# Patient Record
Sex: MALE | Race: WHITE | NOT HISPANIC OR LATINO | Employment: OTHER | URBAN - METROPOLITAN AREA
[De-identification: names, ages, dates, MRNs, and addresses within clinical notes are randomized per-mention and may not be internally consistent; named-entity substitution may affect disease eponyms.]

---

## 2018-11-20 ENCOUNTER — APPOINTMENT (OUTPATIENT)
Dept: RADIOLOGY | Facility: MEDICAL CENTER | Age: 73
DRG: 446 | End: 2018-11-20
Attending: STUDENT IN AN ORGANIZED HEALTH CARE EDUCATION/TRAINING PROGRAM
Payer: COMMERCIAL

## 2018-11-20 ENCOUNTER — HOSPITAL ENCOUNTER (INPATIENT)
Facility: MEDICAL CENTER | Age: 73
LOS: 2 days | DRG: 446 | End: 2018-11-22
Attending: EMERGENCY MEDICINE | Admitting: INTERNAL MEDICINE
Payer: COMMERCIAL

## 2018-11-20 DIAGNOSIS — E80.6 HYPERBILIRUBINEMIA: ICD-10-CM

## 2018-11-20 DIAGNOSIS — R74.01 TRANSAMINITIS: ICD-10-CM

## 2018-11-20 LAB
ALBUMIN SERPL BCP-MCNC: 4.7 G/DL (ref 3.2–4.9)
ALBUMIN/GLOB SERPL: 1.7 G/DL
ALP SERPL-CCNC: 176 U/L (ref 30–99)
ALT SERPL-CCNC: 787 U/L (ref 2–50)
ANION GAP SERPL CALC-SCNC: 10 MMOL/L (ref 0–11.9)
AST SERPL-CCNC: 348 U/L (ref 12–45)
BASOPHILS # BLD AUTO: 0.2 % (ref 0–1.8)
BASOPHILS # BLD: 0.01 K/UL (ref 0–0.12)
BILIRUB SERPL-MCNC: 2.8 MG/DL (ref 0.1–1.5)
BUN SERPL-MCNC: 11 MG/DL (ref 8–22)
CALCIUM SERPL-MCNC: 9.4 MG/DL (ref 8.5–10.5)
CHLORIDE SERPL-SCNC: 104 MMOL/L (ref 96–112)
CO2 SERPL-SCNC: 25 MMOL/L (ref 20–33)
CREAT SERPL-MCNC: 0.93 MG/DL (ref 0.5–1.4)
EOSINOPHIL # BLD AUTO: 0.24 K/UL (ref 0–0.51)
EOSINOPHIL NFR BLD: 4.8 % (ref 0–6.9)
ERYTHROCYTE [DISTWIDTH] IN BLOOD BY AUTOMATED COUNT: 44.1 FL (ref 35.9–50)
EST. AVERAGE GLUCOSE BLD GHB EST-MCNC: 146 MG/DL
FERRITIN SERPL-MCNC: 634.9 NG/ML (ref 22–322)
GLOBULIN SER CALC-MCNC: 2.8 G/DL (ref 1.9–3.5)
GLUCOSE SERPL-MCNC: 92 MG/DL (ref 65–99)
HBA1C MFR BLD: 6.7 % (ref 0–5.6)
HCT VFR BLD AUTO: 47.3 % (ref 42–52)
HGB BLD-MCNC: 16 G/DL (ref 14–18)
IMM GRANULOCYTES # BLD AUTO: 0.02 K/UL (ref 0–0.11)
IMM GRANULOCYTES NFR BLD AUTO: 0.4 % (ref 0–0.9)
IRON SATN MFR SERPL: 26 % (ref 15–55)
IRON SERPL-MCNC: 114 UG/DL (ref 50–180)
LIPASE SERPL-CCNC: 71 U/L (ref 11–82)
LYMPHOCYTES # BLD AUTO: 1.35 K/UL (ref 1–4.8)
LYMPHOCYTES NFR BLD: 26.9 % (ref 22–41)
MAGNESIUM SERPL-MCNC: 2 MG/DL (ref 1.5–2.5)
MCH RBC QN AUTO: 30.8 PG (ref 27–33)
MCHC RBC AUTO-ENTMCNC: 33.8 G/DL (ref 33.7–35.3)
MCV RBC AUTO: 91 FL (ref 81.4–97.8)
MONOCYTES # BLD AUTO: 0.45 K/UL (ref 0–0.85)
MONOCYTES NFR BLD AUTO: 9 % (ref 0–13.4)
NEUTROPHILS # BLD AUTO: 2.94 K/UL (ref 1.82–7.42)
NEUTROPHILS NFR BLD: 58.7 % (ref 44–72)
NRBC # BLD AUTO: 0 K/UL
NRBC BLD-RTO: 0 /100 WBC
PLATELET # BLD AUTO: 196 K/UL (ref 164–446)
PMV BLD AUTO: 10.2 FL (ref 9–12.9)
POTASSIUM SERPL-SCNC: 3.5 MMOL/L (ref 3.6–5.5)
PROT SERPL-MCNC: 7.5 G/DL (ref 6–8.2)
RBC # BLD AUTO: 5.2 M/UL (ref 4.7–6.1)
SODIUM SERPL-SCNC: 139 MMOL/L (ref 135–145)
TIBC SERPL-MCNC: 447 UG/DL (ref 250–450)
WBC # BLD AUTO: 5 K/UL (ref 4.8–10.8)

## 2018-11-20 PROCEDURE — 83550 IRON BINDING TEST: CPT

## 2018-11-20 PROCEDURE — 83690 ASSAY OF LIPASE: CPT

## 2018-11-20 PROCEDURE — 82728 ASSAY OF FERRITIN: CPT

## 2018-11-20 PROCEDURE — 80053 COMPREHEN METABOLIC PANEL: CPT

## 2018-11-20 PROCEDURE — 99285 EMERGENCY DEPT VISIT HI MDM: CPT

## 2018-11-20 PROCEDURE — 85025 COMPLETE CBC W/AUTO DIFF WBC: CPT

## 2018-11-20 PROCEDURE — 83036 HEMOGLOBIN GLYCOSYLATED A1C: CPT

## 2018-11-20 PROCEDURE — 80074 ACUTE HEPATITIS PANEL: CPT

## 2018-11-20 PROCEDURE — 76705 ECHO EXAM OF ABDOMEN: CPT

## 2018-11-20 PROCEDURE — 770006 HCHG ROOM/CARE - MED/SURG/GYN SEMI*

## 2018-11-20 PROCEDURE — 83735 ASSAY OF MAGNESIUM: CPT

## 2018-11-20 PROCEDURE — 83540 ASSAY OF IRON: CPT

## 2018-11-20 RX ORDER — LANOLIN ALCOHOL/MO/W.PET/CERES
1000 CREAM (GRAM) TOPICAL DAILY
COMMUNITY

## 2018-11-20 RX ORDER — MORPHINE SULFATE 10 MG/ML
1-3 INJECTION, SOLUTION INTRAMUSCULAR; INTRAVENOUS
Status: DISCONTINUED | OUTPATIENT
Start: 2018-11-20 | End: 2018-11-22 | Stop reason: HOSPADM

## 2018-11-20 RX ORDER — POLYETHYLENE GLYCOL 3350 17 G/17G
1 POWDER, FOR SOLUTION ORAL
Status: DISCONTINUED | OUTPATIENT
Start: 2018-11-20 | End: 2018-11-22 | Stop reason: HOSPADM

## 2018-11-20 RX ORDER — LABETALOL HYDROCHLORIDE 5 MG/ML
10 INJECTION, SOLUTION INTRAVENOUS EVERY 4 HOURS PRN
Status: DISCONTINUED | OUTPATIENT
Start: 2018-11-20 | End: 2018-11-21

## 2018-11-20 RX ORDER — SIMVASTATIN 40 MG
40 TABLET ORAL NIGHTLY
Status: ON HOLD | COMMUNITY
End: 2018-11-22

## 2018-11-20 RX ORDER — AMOXICILLIN 250 MG
2 CAPSULE ORAL 2 TIMES DAILY
Status: DISCONTINUED | OUTPATIENT
Start: 2018-11-20 | End: 2018-11-22 | Stop reason: HOSPADM

## 2018-11-20 RX ORDER — FOLIC ACID 1 MG/1
1 TABLET ORAL DAILY
Status: DISCONTINUED | OUTPATIENT
Start: 2018-11-21 | End: 2018-11-22 | Stop reason: HOSPADM

## 2018-11-20 RX ORDER — LISINOPRIL 10 MG/1
10 TABLET ORAL DAILY
Status: ON HOLD | COMMUNITY
End: 2018-11-22

## 2018-11-20 RX ORDER — CHOLECALCIFEROL (VITAMIN D3) 125 MCG
1000 CAPSULE ORAL DAILY
Status: DISCONTINUED | OUTPATIENT
Start: 2018-11-21 | End: 2018-11-22 | Stop reason: HOSPADM

## 2018-11-20 RX ORDER — LISINOPRIL 10 MG/1
10 TABLET ORAL DAILY
Status: DISCONTINUED | OUTPATIENT
Start: 2018-11-21 | End: 2018-11-21

## 2018-11-20 RX ORDER — BISACODYL 10 MG
10 SUPPOSITORY, RECTAL RECTAL
Status: DISCONTINUED | OUTPATIENT
Start: 2018-11-20 | End: 2018-11-22 | Stop reason: HOSPADM

## 2018-11-20 ASSESSMENT — LIFESTYLE VARIABLES
ON A TYPICAL DAY WHEN YOU DRINK ALCOHOL HOW MANY DRINKS DO YOU HAVE: 3
EVER FELT BAD OR GUILTY ABOUT YOUR DRINKING: YES
HAVE YOU EVER FELT YOU SHOULD CUT DOWN ON YOUR DRINKING: YES
TOTAL SCORE: 2
EVER_SMOKED: NEVER
DOES PATIENT WANT TO STOP DRINKING: NO
DO YOU DRINK ALCOHOL: YES
TOTAL SCORE: 2
HOW MANY TIMES IN THE PAST YEAR HAVE YOU HAD 5 OR MORE DRINKS IN A DAY: 0
TOTAL SCORE: 2
CONSUMPTION TOTAL: POSITIVE
ALCOHOL_USE: YES
AVERAGE NUMBER OF DAYS PER WEEK YOU HAVE A DRINK CONTAINING ALCOHOL: 7
EVER HAD A DRINK FIRST THING IN THE MORNING TO STEADY YOUR NERVES TO GET RID OF A HANGOVER: NO
HAVE PEOPLE ANNOYED YOU BY CRITICIZING YOUR DRINKING: NO

## 2018-11-20 ASSESSMENT — PAIN SCALES - GENERAL
PAINLEVEL_OUTOF10: 0
PAINLEVEL_OUTOF10: 2
PAINLEVEL_OUTOF10: 0

## 2018-11-20 ASSESSMENT — PATIENT HEALTH QUESTIONNAIRE - PHQ9
1. LITTLE INTEREST OR PLEASURE IN DOING THINGS: NOT AT ALL
SUM OF ALL RESPONSES TO PHQ9 QUESTIONS 1 AND 2: 0
2. FEELING DOWN, DEPRESSED, IRRITABLE, OR HOPELESS: NOT AT ALL

## 2018-11-21 ENCOUNTER — PATIENT OUTREACH (OUTPATIENT)
Dept: HEALTH INFORMATION MANAGEMENT | Facility: OTHER | Age: 73
End: 2018-11-21

## 2018-11-21 PROBLEM — E78.5 DYSLIPIDEMIA: Status: ACTIVE | Noted: 2018-11-21

## 2018-11-21 PROBLEM — I95.9 HYPOTENSION: Status: ACTIVE | Noted: 2018-11-21

## 2018-11-21 PROBLEM — R74.01 TRANSAMINITIS: Status: ACTIVE | Noted: 2018-11-21

## 2018-11-21 PROBLEM — R10.9 ABDOMINAL PAIN: Status: ACTIVE | Noted: 2018-11-21

## 2018-11-21 PROBLEM — E11.9 TYPE 2 DIABETES MELLITUS (HCC): Status: ACTIVE | Noted: 2018-11-21

## 2018-11-21 PROBLEM — E87.6 HYPOKALEMIA: Status: ACTIVE | Noted: 2018-11-21

## 2018-11-21 PROBLEM — I10 HTN (HYPERTENSION): Status: ACTIVE | Noted: 2018-11-21

## 2018-11-21 LAB
ALBUMIN SERPL BCP-MCNC: 4.2 G/DL (ref 3.2–4.9)
ALBUMIN/GLOB SERPL: 1.4 G/DL
ALP SERPL-CCNC: 153 U/L (ref 30–99)
ALT SERPL-CCNC: 537 U/L (ref 2–50)
AMPHET UR QL SCN: NEGATIVE
ANION GAP SERPL CALC-SCNC: 7 MMOL/L (ref 0–11.9)
APAP SERPL-MCNC: <10 UG/ML (ref 10–30)
APTT PPP: 29.4 SEC (ref 24.7–36)
AST SERPL-CCNC: 212 U/L (ref 12–45)
BARBITURATES UR QL SCN: NEGATIVE
BENZODIAZ UR QL SCN: NEGATIVE
BILIRUB CONJ SERPL-MCNC: 0.7 MG/DL (ref 0.1–0.5)
BILIRUB SERPL-MCNC: 1.6 MG/DL (ref 0.1–1.5)
BUN SERPL-MCNC: 14 MG/DL (ref 8–22)
BZE UR QL SCN: NEGATIVE
CALCIUM SERPL-MCNC: 9.3 MG/DL (ref 8.5–10.5)
CANNABINOIDS UR QL SCN: NEGATIVE
CHLORIDE SERPL-SCNC: 106 MMOL/L (ref 96–112)
CHOLEST SERPL-MCNC: 125 MG/DL (ref 100–199)
CO2 SERPL-SCNC: 27 MMOL/L (ref 20–33)
CREAT SERPL-MCNC: 1.15 MG/DL (ref 0.5–1.4)
GLOBULIN SER CALC-MCNC: 2.9 G/DL (ref 1.9–3.5)
GLUCOSE SERPL-MCNC: 146 MG/DL (ref 65–99)
H PYLORI AG STL QL IA: NOT DETECTED
HAV IGM SERPL QL IA: NEGATIVE
HBV CORE IGM SER QL: NEGATIVE
HBV SURFACE AG SER QL: NEGATIVE
HCV AB SER QL: NEGATIVE
HDLC SERPL-MCNC: 40 MG/DL
INR PPP: 0.96 (ref 0.87–1.13)
LDLC SERPL CALC-MCNC: 66 MG/DL
METHADONE UR QL SCN: NEGATIVE
OPIATES UR QL SCN: NEGATIVE
OXYCODONE UR QL SCN: NEGATIVE
PCP UR QL SCN: NEGATIVE
POTASSIUM SERPL-SCNC: 3.7 MMOL/L (ref 3.6–5.5)
PROPOXYPH UR QL SCN: NEGATIVE
PROT SERPL-MCNC: 7.1 G/DL (ref 6–8.2)
PROTHROMBIN TIME: 12.9 SEC (ref 12–14.6)
SALICYLATES SERPL-MCNC: 0 MG/DL (ref 15–25)
SODIUM SERPL-SCNC: 140 MMOL/L (ref 135–145)
TRIGL SERPL-MCNC: 93 MG/DL (ref 0–149)

## 2018-11-21 PROCEDURE — 700105 HCHG RX REV CODE 258: Performed by: INTERNAL MEDICINE

## 2018-11-21 PROCEDURE — 36415 COLL VENOUS BLD VENIPUNCTURE: CPT

## 2018-11-21 PROCEDURE — 80307 DRUG TEST PRSMV CHEM ANLYZR: CPT

## 2018-11-21 PROCEDURE — 85610 PROTHROMBIN TIME: CPT

## 2018-11-21 PROCEDURE — A9270 NON-COVERED ITEM OR SERVICE: HCPCS | Performed by: STUDENT IN AN ORGANIZED HEALTH CARE EDUCATION/TRAINING PROGRAM

## 2018-11-21 PROCEDURE — A9270 NON-COVERED ITEM OR SERVICE: HCPCS | Performed by: INTERNAL MEDICINE

## 2018-11-21 PROCEDURE — 87338 HPYLORI STOOL AG IA: CPT

## 2018-11-21 PROCEDURE — 770006 HCHG ROOM/CARE - MED/SURG/GYN SEMI*

## 2018-11-21 PROCEDURE — 700102 HCHG RX REV CODE 250 W/ 637 OVERRIDE(OP): Performed by: STUDENT IN AN ORGANIZED HEALTH CARE EDUCATION/TRAINING PROGRAM

## 2018-11-21 PROCEDURE — 700102 HCHG RX REV CODE 250 W/ 637 OVERRIDE(OP): Performed by: INTERNAL MEDICINE

## 2018-11-21 PROCEDURE — 99223 1ST HOSP IP/OBS HIGH 75: CPT | Mod: GC | Performed by: INTERNAL MEDICINE

## 2018-11-21 PROCEDURE — 80061 LIPID PANEL: CPT

## 2018-11-21 PROCEDURE — 85730 THROMBOPLASTIN TIME PARTIAL: CPT

## 2018-11-21 PROCEDURE — 80053 COMPREHEN METABOLIC PANEL: CPT

## 2018-11-21 PROCEDURE — 82248 BILIRUBIN DIRECT: CPT

## 2018-11-21 RX ORDER — POTASSIUM CHLORIDE 20 MEQ/1
40 TABLET, EXTENDED RELEASE ORAL ONCE
Status: COMPLETED | OUTPATIENT
Start: 2018-11-21 | End: 2018-11-21

## 2018-11-21 RX ORDER — POTASSIUM CHLORIDE 7.45 MG/ML
10 INJECTION INTRAVENOUS
Status: DISCONTINUED | OUTPATIENT
Start: 2018-11-21 | End: 2018-11-21

## 2018-11-21 RX ORDER — SODIUM CHLORIDE 9 MG/ML
2000 INJECTION, SOLUTION INTRAVENOUS ONCE
Status: COMPLETED | OUTPATIENT
Start: 2018-11-21 | End: 2018-11-21

## 2018-11-21 RX ORDER — THIAMINE MONONITRATE (VIT B1) 100 MG
100 TABLET ORAL DAILY
Status: DISCONTINUED | OUTPATIENT
Start: 2018-11-21 | End: 2018-11-22 | Stop reason: HOSPADM

## 2018-11-21 RX ORDER — HYDRALAZINE HYDROCHLORIDE 20 MG/ML
20 INJECTION INTRAMUSCULAR; INTRAVENOUS EVERY 6 HOURS PRN
Status: DISCONTINUED | OUTPATIENT
Start: 2018-11-21 | End: 2018-11-22 | Stop reason: HOSPADM

## 2018-11-21 RX ORDER — SODIUM CHLORIDE 9 MG/ML
INJECTION, SOLUTION INTRAVENOUS CONTINUOUS
Status: DISCONTINUED | OUTPATIENT
Start: 2018-11-21 | End: 2018-11-22 | Stop reason: HOSPADM

## 2018-11-21 RX ADMIN — SODIUM CHLORIDE: 9 INJECTION, SOLUTION INTRAVENOUS at 13:19

## 2018-11-21 RX ADMIN — SODIUM CHLORIDE 2000 ML: 9 INJECTION, SOLUTION INTRAVENOUS at 08:56

## 2018-11-21 RX ADMIN — POTASSIUM CHLORIDE 40 MEQ: 1500 TABLET, EXTENDED RELEASE ORAL at 10:27

## 2018-11-21 RX ADMIN — THIAMINE HCL TAB 100 MG 100 MG: 100 TAB at 10:26

## 2018-11-21 RX ADMIN — CYANOCOBALAMIN TAB 500 MCG 1000 MCG: 500 TAB at 10:28

## 2018-11-21 RX ADMIN — FOLIC ACID 1 MG: 1 TABLET ORAL at 10:27

## 2018-11-21 ASSESSMENT — PAIN SCALES - GENERAL
PAINLEVEL_OUTOF10: 0

## 2018-11-21 ASSESSMENT — COGNITIVE AND FUNCTIONAL STATUS - GENERAL
SUGGESTED CMS G CODE MODIFIER DAILY ACTIVITY: CH
DAILY ACTIVITIY SCORE: 24
MOBILITY SCORE: 24
SUGGESTED CMS G CODE MODIFIER MOBILITY: CH

## 2018-11-21 ASSESSMENT — ENCOUNTER SYMPTOMS
HEADACHES: 0
ORTHOPNEA: 0
NAUSEA: 1
ABDOMINAL PAIN: 0
VOMITING: 0
PALPITATIONS: 0
DIARRHEA: 0
FEVER: 0
CHILLS: 0
WEAKNESS: 0
BLURRED VISION: 0
HEARTBURN: 0
NERVOUS/ANXIOUS: 0
DIZZINESS: 0
BLOOD IN STOOL: 0
SHORTNESS OF BREATH: 0
SINUS PAIN: 0
DIAPHORESIS: 0
COUGH: 0
ABDOMINAL PAIN: 1
DEPRESSION: 0
BACK PAIN: 0
DOUBLE VISION: 0
NAUSEA: 0
CONSTIPATION: 0
MYALGIAS: 0
SORE THROAT: 0
CHILLS: 1

## 2018-11-21 NOTE — ASSESSMENT & PLAN NOTE
-Alcohol and tylenol use vs autoimmune hepatitis vs gallstones   -ALT/AST repeated on admit  787/348 Alk phos 176  -[facility]  [facility] 2.8 from 5.6  -US liver did not show any evidence of stones, mildly dilated CBD  -[facility]ol [facility] < 10 [facility], lipase wnl 77  -Hepatitis negative. UDS negative  -Follow up with PCP for further trending of LFT's.  Restart statin when enzymes normalized

## 2018-11-21 NOTE — ED NOTES
Hourly rounding performed. Assessed patient complaints and Bathroom/comfort needs.    Meal given

## 2018-11-21 NOTE — ED NOTES
Hourly rounding performed. Assessed patient complaints and Bathroom/comfort needs.    Pt ambulated to the bathroom. No Noted difficulties with ambulation.

## 2018-11-21 NOTE — NON-PROVIDER
Internal Medicine Medical Student Note  Note Author: Julia Abdullahi, Student    Name Erick Solitario       1945   Age/Sex 73 y.o. male   MRN [id number]   Code Status FULL              Reason for interval visit  (Principal Problem)   Abdominal pain    Interval Problem Daily Status Update  (problem status, last 24 hours, new history, new data )   MR. Solitario is a 74 y/o who was admitted for ongoing abdominal pain for the past 4 days. The pain is in the epigastric region and radiates to the back. He went to the ER in [facility] for the pain whereby CT and EKG were performed and unremarkable. There was H. Pylori antigen present for which he was prescribed treatment. He only took dicyclomine and not the other medications due to cost. The pain had waxed and waned which caused him to come to the [facility] ER and thereby receive admission. Ultrasound was performed that revealed mild extrahepatic dilation and mildly enlarged portal vein with hepatopedal flow.  He has had a past history of gallstones that warranted cholecystectomy over 30 years ago and describes this pain as similar. Upon admission, his AST and ALT were significantly elevated as well as total bilirubin/direct bilirubin and alkaline phosphatase.     Since admission, these levels have continued to decrease. His INR was within normal limits, so GI consult will not be needed. The patient's scleral icterus that was noted on admission is lessening and he reports no pain. The patient was also hypotensive, so fluids were provided. If his liver functioning levels continue to normalize, will consider discharging the patient tomorrow.         Physical Exam       Vitals:    18 2020 18 2244 18 0524 18 0805   BP:  148/85 (!) 96/56 122/74   Pulse:  85 70 68   Resp: (!)    Temp:  36.5 °C (97.7 °F) 36.9 °C (98.4 °F) 36.1 °C (97 °F)   TempSrc:  Temporal Temporal Temporal   SpO2: 96% 95% 96% 96%   Weight:       Height:         Body mass  index is 25.84 kg/m². Weight: 79.4 kg (175 lb)  Oxygen Therapy:  Pulse Oximetry: 96 %, O2 (LPM): 0, O2 Delivery: None (Room Air)    Physical Exam  General: appropriate, not in acute distress  HEENT: mild scleral icterus, normocephalic, atraumatic  Cardio: normal rate and rhythm  Pulm: clear to auscultation bilaterally  GI: no tenderness to palpation, no distension, no guarding  : no dysuria, no hematuria  Extremities: well-perfused, no edema          Assessment/Plan    Mr. Solitario is a 74 y/o man who was admitted last night for acute epigastric pain radiating to the back and elevated liver function enzymes. His plan of care will be as followed:     # elevated liver enzymes  # RUQ pain  -patient presented with significantly elevated liver enzymes  -u/s revealed mild extrahepatic dilation and mildly enlarged portal vein with hepatorenal flow; presentation is suggestive of gallstone possibly in common bile duct  -patient was also hypotensive  -patient was provided fluids and liver enzyme levels have been decreasing  -patient's INR was within normal limits; GI consult will not be needed at this time   -will continue to monitor the patient for continued decrease of elevated liver enzymes; if the levels continue to decrease, will consider discharge tomorrow  -will hold paitient's simvistatin and any hepatotoxic drugs including acetaminophen until liver enzyme tests normalize   -will order BRYANNA to assess for autoimmune hepatitis  -advised patient to do home BP monitoring      # H pylori  -patient has waxing and waning epigastric pain for the past few days  -at [facility], patient's serology showed H pylori for which he was given treatment that he did not complete  -will get stool sample to test for H pylori infection and discharge patient with appropriate treatment

## 2018-11-21 NOTE — DISCHARGE SUMMARY
Internal Medicine Discharge Summary  Note Author: Frederick Barrera M.D.       Name Erick Solitario       1945   Age/Sex 73 y.o. male   MRN [id number]         Admit Date:  2018       Discharge Date:   18     Service:   R Internal Medicine Gray Team  Attending Physician(s):   Dr. smith       Senior Resident(s):   Dr. Licea  [place] Resident(s):   Dr. Barrera  PCP: No primary care provider on file.      Primary Diagnosis:   Choledocolithiasis with spontaneous passage    Secondary Diagnoses:                Principal Problem (Resolved):    Abdominal pain POA: Yes  Active Problems:    Transaminitis POA: Yes    Type 2 diabetes mellitus (HCC) POA: Yes    HTN (hypertension) POA: Yes    Dyslipidemia POA: Yes  Resolved Problems:    Hypokalemia POA: Yes    Hypotension POA: No      Hospital Summary (Brief Narrative):       Erick Solitario is a 73 year old male with PMHx of DM, DLD, HTN, cholecystecomy,  who presents to the ED with 4 day history of colicky RUQ abdominal pain lasting 4-6 hours.  [facility] pattern LFT's (Bilirubin 5.6, AST//757) and patient was transferred here for ERCP for suspected choledocolithiasis.  On admission to [facility], RUQ ultrasound revealed mildly common bile duct of 0.76cm.  LFT's trended downward with supportive management and fluids, and abdominal pain resolved, therefore gastroenterology was not consulted.  His home simvastatin was withheld given his elevated liver enzymes.    During the stay, patient also had several episodes of Hypotension with systolics in 90's, asymptomatic.  Given IV normal saline, and lisinopril with-held on discharge.      Patient /Hospital Summary (Details -- Problem Oriented) :          Transaminitis   Assessment & Plan    -Alcohol and tylenol use vs autoimmune hepatitis vs gallstones   -ALT/AST repeated on admit  787/348 Alk phos 176  -[facility] bili  [facility]proved [facility] down to 2.8 from  5.6  -US liver did not show any evidence of stones, mildly dilated CBD  -[facility]ol [facility] < 10 [facility], lipase wnl 77  -Hepatitis negative. UDS negative  -Follow up with PCP for further trending of LFT's.  Restart statin when enzymes normalized       * Abdominal pain-resolved as of 11/22/2018   Assessment & Plan    -RUQ, burning/knawing type pain is concerning for ulcer but with the transaminitis and hyperbilirubinemia made it concerning for choledolithiasis.   -has had cholecystectomy and no gallstones seen on the US - mild dilation of CBD can be attributed to age/and post surgical changes  -Guiac negative  -LFT's trending downward  -pain resolved - likley spontaneous stone passage  -counselled on preventative measures for further stone formation  -follow up with PCP     Dyslipidemia   Assessment & Plan    Repeat lipid panel, do not have previous results  Holding simvastatin due to transaminitis     HTN (hypertension)   Assessment & Plan    -hold lisinopril in setting of episode of hypotension 90's systolic  -restart on discharge     Type 2 diabetes mellitus (HCC)   Assessment & Plan    A1c 6.7% 11/20/18   Holding metformin         Consultants:     none    Procedures:        none    Imaging/ Testing:      US-RUQ   Final Result      1.  Prior cholecystectomy   2.  Mild extrahepatic biliary dilatation without intrahepatic biliary dilatation is likely related to age and prior cholecystectomy   3.  Mildly enlarged portal vein with hepatopedal flow   4.  Echogenic liver, a nonspecific finding that often is found to represent steatosis.  Other infiltrative processes could have an identical appearance.            Discharge Medications:         Medication Reconciliation: Completed       Medication List      CONTINUE taking these medications      Instructions   aspirin  MG Tbec  Commonly known as:  ECOTRIN   Take 325 mg by mouth every day.  Dose:  325 mg     FOLIC ACID PO   Take 1 Tab by mouth every day.  Dose:  1 Tab      metformin 1000 MG tablet  Commonly known as:  GLUCOPHAGE   Take 1,000 mg by mouth 2 times a day, with meals.  Dose:  1000 mg     vitamin B-12 1000 MCG Tabs   Take 1,000 mcg by mouth every day.  Dose:  1000 mcg     Vitamin D3 1000 units Caps   Take 1,000 Units by mouth every day.  Dose:  1000 Units        STOP taking these medications    lisinopril 10 MG Tabs  Commonly known as:  PRINIVIL     simvastatin 40 MG Tabs  Commonly known as:  ZOCOR                Disposition:   To home    Diet:   Hepatic and Diabetic diet    Activity:   As tolerated    Instructions:         The patient was instructed to return to the ER in the event of worsening symptoms. I have counseled the patient on the importance of compliance and the patient has agreed to proceed with all medical recommendations and follow up plan indicated above.   The patient understands that all medications come with benefits and risks. Risks may include permanent injury or death and these risks can be minimized with close reassessment and monitoring.        Primary Care Provider:    No primary care provider on file.    Discharge summary faxed to primary care provider:  Deferred  Copy of discharge summary given to the patient: Completed      Follow up appointment details :      Please follow up with your PCP within one week for post discharge    Pending Studies:        -BRYANNA to r/o autoimmune liver disease    Time spent on discharge day patient visit, preparing discharge paperwork and arranging for patient follow up.    Summary of follow up issues:   -Transaminitis - resolving after suspected spontaneous choledocolithiasis passage.  Simvastatin withheld, to be restarted at later date.  - Hypotension, patient was instructed to hold off lisinopril and recheck BP at home, will restart if SBP>140 or DBP > 100     Discharge Time (Minutes) :    59  Hospital Course Type:  Inpatient Stay >2 midnights      Condition on Discharge     ______________________________________________________________________    Interval history/exam for day of discharge:     -patient is feeling well.  No abdominal pain, fevers/cihlls, nausea/vomiting.  AST, ALT, Bilirubin down trending.  Patient is eager to go home  -patient assessed at bedside.  counselled on exercise and diet for gallstones.  Patient expressed understanding and agrees to lifestyle modification.    Physical Exam   Constitutional: He is well-developed, well-nourished, and in no distress.   HENT:   Head: Normocephalic and atraumatic.   Eyes: EOM are normal.  No scleral icterus  Neck: Normal range of motion. Neck supple.   Cardiovascular: Normal rate, regular rhythm and normal heart sounds.  Exam reveals no gallop and no friction rub.    No murmur heard.  Pulmonary/Chest: Effort normal and breath sounds normal. No respiratory distress. He has no wheezes. He has no rales.   Abdominal: Soft. Bowel sounds are normal. He exhibits no distension. There is no tenderness. There is no rebound and no guarding.   Musculoskeletal: Normal range of motion. He exhibits no edema.     Neurological: He is alert.   Skin: Skin is warm and dry.   Psychiatric: Mood and affect normal.       Most Recent Labs:    Lab Results   Component Value Date/Time    WBC 5.0 11/22/2018 12:41 AM    RBC 4.49 (L) 11/22/2018 12:41 AM    HEMOGLOBIN 13.9 (L) 11/22/2018 12:41 AM    HEMATOCRIT 41.2 (L) 11/22/2018 12:41 AM    MCV 91.8 11/22/2018 12:41 AM    MCH 31.0 11/22/2018 12:41 AM    MCHC 33.7 11/22/2018 12:41 AM    MPV 10.7 11/22/2018 12:41 AM    NEUTSPOLYS 46.20 11/22/2018 12:41 AM    LYMPHOCYTES 36.80 11/22/2018 12:41 AM    MONOCYTES 9.00 11/22/2018 12:41 AM    EOSINOPHILS 7.40 (H) 11/22/2018 12:41 AM    BASOPHILS 0.40 11/22/2018 12:41 AM      Lab Results   Component Value Date/Time    SODIUM 141 11/22/2018 12:41 AM    POTASSIUM 4.2 11/22/2018 12:41 AM    CHLORIDE 110 11/22/2018 12:41 AM    CO2 24 11/22/2018 12:41 AM    GLUCOSE 94  11/22/2018 12:41 AM    BUN 16 11/22/2018 12:41 AM    CREATININE 0.97 11/22/2018 12:41 AM      Lab Results   Component Value Date/Time    ALTSGPT 362 (H) 11/22/2018 12:41 AM    ASTSGOT 103 (H) 11/22/2018 12:41 AM    ALKPHOSPHAT 122 (H) 11/22/2018 12:41 AM    TBILIRUBIN 1.0 11/22/2018 12:41 AM    DBILIRUBIN 0.7 (H) 11/21/2018 07:57 AM    LIPASE 71 11/20/2018 08:40 PM    ALBUMIN 4.0 11/22/2018 12:41 AM    GLOBULIN 2.3 11/22/2018 12:41 AM    INR 1.01 11/22/2018 12:41 AM     Lab Results   Component Value Date/Time    PROTHROMBTM 13.4 11/22/2018 12:41 AM    INR 1.01 11/22/2018 12:41 AM

## 2018-11-21 NOTE — PROGRESS NOTES
Rec' pt via wheel chair, escorted by pt transport. Placed in room. Assumed pt care. Assessment completed. AA&OX4. Denies pain at this time. No s/s of discomfort or distress. Admit profile completed. Oriented pt to S527-2, explained unit routine including call light use, plan of care, smoking policy, visiting hours. Pt verbalized understanding. Pt is up self, ambulates in his room and maintains steady gait. Notified pt that a urine sample is needed, pt will use urinal when needing to void. Bed in lowest position, bed locked, treaded socks in place, RN and CNA numbers provided, call light with in reach.

## 2018-11-21 NOTE — CARE PLAN
Problem: Bowel/Gastric:  Goal: Normal bowel function is maintained or improved  Pt reports a normal BM this AM, declined stool softener.     Problem: Knowledge Deficit  Goal: Knowledge of disease process/condition, treatment plan, diagnostic tests, and medications will improve    Intervention: Explain information regarding disease process/condition, treatment plan, diagnostic tests, and medications and document in education  Updated pt on plan of care including NPO, urine sample needed, awaiting results of U/S of liver.

## 2018-11-21 NOTE — PROGRESS NOTES
Internal Medicine Interval Note  Note Author: Frederick Matthews M.D.     Name Erick Solitario       1945   Age/Sex 73 y.o. male   MRN [id number]   Code Status DNAR/DNI     After 5PM or if no immediate response to page, please call for cross-coverage  Attending/Team: Rashaad Kaplan See Patient List for primary contact information  Call [phone] to page    1st Call - Day Intern (R1):   Dr. MATTHEWS 2nd Call - Day Sr. Resident (R2/R3):   Dr. Licea         Reason for interval visit  (Principal Problem)   Abdominal pain with transaminitis      Interval Problem Daily Status Update  (24 hours, problem oriented, brief subjective history, new lab/imaging data pertinent to that problem)     -no acute events overnight.  Currently patient is not experiencing abdominal pain.  However the pain is episodic 4-6 hour pain, 10/10, with radiation to the back.    -[facility] to 413/757 from 248/184 on Saturday at [facility] and T bili/direct bili went up from 1.2/0.6 to 5.6/4.3.  INR within normal limits below 1.0.    -patient did not fill out H Pylori medications because of medication costs.    -patient also noted to be hypotensive - give 2 L bolus NS and 150 ml/hr infusion.    -patient curious about condition, education provided regarding medical condition.    Review of Systems   Constitutional: Negative for chills and fever.   Respiratory: Negative for cough and shortness of breath.    Cardiovascular: Negative for chest pain and palpitations.   Gastrointestinal: Negative for abdominal pain, blood in stool, constipation, diarrhea, melena, nausea and vomiting.   Genitourinary: Negative for dysuria, frequency and urgency.   Musculoskeletal: Negative for back pain and myalgias.   Neurological: Negative for dizziness and headaches.       Disposition/Barriers to discharge:   None anticipated  Disposition to home    Consultants/Specialty  Gastroenterology  PCP: No primary care provider on  file.      Quality Measures  Quality-Core Measures   Reviewed items::  Labs reviewed and Medications reviewed  Cadena catheter::  No Cadena  DVT prophylaxis pharmacological::  Contraindicated - High bleeding risk        Physical Exam       Vitals:    11/20/18 2020 11/20/18 2244 11/21/18 0524 11/21/18 0805   BP:  148/85 (!) 96/56 122/74   Pulse:  85 70 68   Resp: (!) 22 18 18 18   Temp:  36.5 °C (97.7 °F) 36.9 °C (98.4 °F) 36.1 °C (97 °F)   TempSrc:  Temporal Temporal Temporal   SpO2: 96% 95% 96% 96%   Weight:       Height:         Body mass index is 25.84 kg/m². Weight: 79.4 kg (175 lb)  Oxygen Therapy:  Pulse Oximetry: 96 %, O2 (LPM): 0, O2 Delivery: None (Room Air)    Physical Exam   Constitutional: He is well-developed, well-nourished, and in no distress.   HENT:   Head: Normocephalic and atraumatic.   Eyes: EOM are normal. Scleral icterus (mild) is present.   Neck: Normal range of motion. Neck supple.   Cardiovascular: Normal rate, regular rhythm and normal heart sounds.  Exam reveals no gallop and no friction rub.    No murmur heard.  Pulmonary/Chest: Effort normal and breath sounds normal. No respiratory distress. He has no wheezes. He has no rales.   Abdominal: Soft. Bowel sounds are normal. He exhibits no distension. There is no tenderness. There is no rebound and no guarding.   Musculoskeletal: Normal range of motion. He exhibits no edema.   No CVA tenderness   Neurological: He is alert.   Skin: Skin is warm and dry.   Psychiatric: Mood and affect normal.       Assessment/Plan     * Abdominal pain- (present on admission)   Assessment & Plan    -RUQ, burning/knawing type pain is concerning for ulcer but with the transaminitis and hyperbilirubinemia made it concerning for choledolithiasis.   -has had cholecystectomy and no gallstones seen on the US - mild dilation of CBD can be attributed to age/and post surgical changes  -Guiac negative  -LFT's trending downward    Plan:   -hold off on GI consult for now,  given LFT's downtrending, normal INR. Continue to trend  -start low fat diet and see how patient tolerates oral  -If patient acutely worsens, consider MRCP     Transaminitis- (present on admission)   Assessment & Plan    -Alcohol and tylenol use vs autoimmune hepatitis vs gallstones   -ALT/AST repeated on admit  787/348 Alk phos 176  -T bili  [facility] down to 2.8 from 5.6  -US liver did not show any evidence of stones, mildly dilated CBD  -Tylenol [facility] < 10 in [facility], lipase wnl 77  -Hepatitis negative. UDS negative    Plan:   -H. Pylori test  -statin withheld  -f/u as outpatient for further monitoring     Dyslipidemia- (present on admission)   Assessment & Plan    Repeat lipid panel, do not have previous results  Holding simvastatin due to transaminitis     HTN (hypertension)- (present on admission)   Assessment & Plan    -hold lisinopril in setting of episode of hypotension 90's systolic  -CTM     Type 2 diabetes mellitus (HCC)- (present on admission)   Assessment & Plan    A1c 6.7% 11/20/18   Holding metformin

## 2018-11-21 NOTE — ED NOTES
Hourly rounding performed. Assessed patient complaints and Bathroom/comfort needs.    Updated on admission process

## 2018-11-21 NOTE — H&P
Internal Medicine Admitting History and Physical    Note Author: Agnes Bentley M.D.       Name Altaf Suarez       1945   Age/Sex 73 y.o. male   MRN [id number]   Code Status DNAR/DNI     After 5PM or if no immediate response to page, please call for cross-coverage  Attending/Team: Dr Leo/Rashaad See Patient List for primary contact information  Call [phone] to page    1st Call - Day Intern (R1):   Dr Barrera 2nd Call - Day Sr. Resident (R2/R3):   Dr Licea       Chief Complaint:   Abdominal Pain    HPI:  Mr Solitario is a 73 year old male with PMHx of DM on metformin, DLD on simvastatin, and HTN on lisinopril who has had abdominal pain for the past 4 days. States on Saturday he just had acute onset epigastric abdominal pain.  It lasted for four hours, radiated to his back, was associated with nausea no emesis, SOB, and progressively got worse over the 4 hours until it was a 10/10. Was a burning/knawing type pain. At this time patient went to [facility] on Saturday and had CT chest and abdomen which did not show any acute liver or biliary abnormality. They did troponin and EKG to ensure these were not symptoms of MI and those came back unremarkable. They did test him for H pylori due to concerns over an ulcer which came back positive and patient was prescribed triple therapy for H pylori. Patient only filled the dicyclomine and not the amoxicillin, clarithromycin, GI cocktail, or omeprazole due to cost. His abdominal pain continued off and on for periods of 4-12 hours at a time and so [facility]. [facility]/ALT increased to 413/757 from 248/184 on Saturday at [facility] and T bili/direct bili went up from 1.2/0.6 to 5.6/4.3. His alk phos was 157. [facility] for possible ERCP.     Patient states this pain feels exactly like when he had gallstones and had cholecystectomy 40 years ago. He states he has not  noticed any jaundice, melena, diarrhea, hematochezia, light stools, itching, but has had darker urine the past few days. Has felt bloated since Sunday. He drinks 3 drinks daily. He has had chills since Saturday during these pain episodes but no fevers. Denies any new medication use, stopped taking ASA and simvastatin on Saturday due to concern over ulcer and transaminitis. Has tried only dicyclomine for pain and did not help. Nothing seemed to improve the pain or make it worse.     In the ED: afebrile, HR 85, RUQ US completed which showed prior cholecystectomy with mild extrahepatic biliary dilation without intrahepatic biliary dilation likely related to age or prior cholecystecomy with mildly enlarged portal vein with hepatopedal flow.    Review of Systems   Constitutional: Positive for chills. Negative for diaphoresis, fever and malaise/fatigue.   HENT: Negative for sinus pain and sore throat.    Eyes: Negative for blurred vision and double vision.   Respiratory: Negative for cough and shortness of breath.    Cardiovascular: Negative for chest pain, palpitations and orthopnea.   Gastrointestinal: Positive for abdominal pain and nausea. Negative for blood in stool, constipation, diarrhea, heartburn, melena and vomiting.   Genitourinary: Negative for dysuria, frequency and urgency.   Musculoskeletal: Negative for joint pain and myalgias.   Neurological: Negative for dizziness, weakness and headaches.   Psychiatric/Behavioral: Negative for depression. The patient is not nervous/anxious.              Past Medical History (Chronic medical problem, known complications and current treatment)    Past Medical History:   Diagnosis Date   • BPH (benign prostatic hyperplasia)    • Chronic nonalcoholic liver disease    • Diabetes (HCC)    • Dyslipidemia    • Elevated liver enzymes    • Hypertension    • Obstructive sleep apnea    • Rosacea    • Shoulder pain    • Vitamin B12 deficiency          Past Surgical History:  History  "reviewed. No pertinent surgical history.    Current Outpatient Medications:  Home Medications     Reviewed by Marian Najera (Pharmacy Tech) on 11/20/18 at 1928  Med List Status: Complete   Medication Last Dose Status   aspirin EC (ECOTRIN) 325 MG Tablet Delayed Response 1week ago Active   Cholecalciferol (VITAMIN D3) 1000 units Cap 11/20/2018 Active   Cyanocobalamin (VITAMIN B-12) 1000 MCG Tab 11/20/2018 Active   FOLIC ACID PO 11/20/2018 Active   lisinopril (PRINIVIL) 10 MG Tab 11/20/2018 Active   metformin (GLUCOPHAGE) 1000 MG tablet 11/20/2018 Active   simvastatin (ZOCOR) 40 MG Tab 11/19/2018 Active                Medication Allergy/Sensitivities:  Allergies   Allergen Reactions   • Rosuvastatin      Muscle ache         Family History (mandatory)   History reviewed. No pertinent family history.   Father, 3 uncles/aunts on paternal side - MI     Social History (mandatory)   Drinks at least 3 drinks daily, lives with wife in [place], denies tobacco use and drug use  Social History     Social History   • Marital status:      Spouse name: N/A   • Number of children: N/A   • Years of education: N/A     Occupational History   • Not on file.     Social History Main Topics   • Smoking status: Never Smoker   • Smokeless tobacco: Never Used   • Alcohol use Yes      Comment: etoh abuse   • Drug use: Unknown   • Sexual activity: Not on file     Other Topics Concern   • Not on file     Social History Narrative   • No narrative on file     Living situation: Lives with wife in [place]  PCP : No primary care provider on file.    Physical Exam     Vitals:    11/20/18 1802 11/20/18 1808 11/20/18 2020   BP:  149/96    Pulse:  85    Resp:  16 (!) 22   Temp:  35.9 °C (96.6 °F)    TempSrc:  Temporal    SpO2:  95% 96%   Weight: 79.4 kg (175 lb)     Height: 1.753 m (5' 9\")       Body mass index is 25.84 kg/m².  /96   Pulse 85   Temp 35.9 °C (96.6 °F) (Temporal)   Resp (!) 22   Ht 1.753 m (5' 9\")   Wt 79.4 kg (175 " lb)   SpO2 96%   BMI 25.84 kg/m²   O2 therapy: Pulse Oximetry: 96 %, O2 Delivery: None (Room Air)    Physical Exam   Constitutional: He is oriented to person, place, and time and well-developed, well-nourished, and in no distress.   Walking around the ED    HENT:   Head: Normocephalic and atraumatic.   Mouth/Throat: Oropharynx is clear and moist.   Eyes: Pupils are equal, round, and reactive to light. Left eye exhibits no discharge. Scleral icterus is present.   Neck: Neck supple.   Cardiovascular: Normal rate, regular rhythm, normal heart sounds and intact distal pulses.    No murmur heard.  Pulmonary/Chest: Effort normal. No respiratory distress. He has no wheezes. He has no rales.   Abdominal: Soft. Bowel sounds are normal. He exhibits no distension. There is tenderness. There is no rebound and no guarding.   - Mayer's sign, tender RUQ, no epigastric tenderness no LUQ tenderness   Genitourinary:   Genitourinary Comments: Guaiac negative, no hemorrhoids or masses visualized   Musculoskeletal: Normal range of motion. He exhibits no edema.   Lymphadenopathy:     He has no cervical adenopathy.   Neurological: He is alert and oriented to person, place, and time.   Skin: Skin is warm and dry. No rash noted. No erythema.   Psychiatric: Mood and affect normal.   Nursing note and vitals reviewed.        Data Review       Old Records Request:   Deferred  Current Records review/summary: Completed    Lab Data Review:  Recent Results (from the past 24 hour(s))   CBC with Differential    Collection Time: 11/20/18  8:40 PM   Result Value Ref Range    WBC 5.0 4.8 - 10.8 K/uL    RBC 5.20 4.70 - 6.10 M/uL    Hemoglobin 16.0 14.0 - 18.0 g/dL    Hematocrit 47.3 42.0 - 52.0 %    MCV 91.0 81.4 - 97.8 fL    MCH 30.8 27.0 - 33.0 pg    MCHC 33.8 33.7 - 35.3 g/dL    RDW 44.1 35.9 - 50.0 fL    Platelet Count 196 164 - 446 K/uL    MPV 10.2 9.0 - 12.9 fL    Neutrophils-Polys 58.70 44.00 - 72.00 %    Lymphocytes 26.90 22.00 - 41.00 %     Monocytes 9.00 0.00 - 13.40 %    Eosinophils 4.80 0.00 - 6.90 %    Basophils 0.20 0.00 - 1.80 %    Immature Granulocytes 0.40 0.00 - 0.90 %    Nucleated RBC 0.00 /100 WBC    Neutrophils (Absolute) 2.94 1.82 - 7.42 K/uL    Lymphs (Absolute) 1.35 1.00 - 4.80 K/uL    Monos (Absolute) 0.45 0.00 - 0.85 K/uL    Eos (Absolute) 0.24 0.00 - 0.51 K/uL    Baso (Absolute) 0.01 0.00 - 0.12 K/uL    Immature Granulocytes (abs) 0.02 0.00 - 0.11 K/uL    NRBC (Absolute) 0.00 K/uL   FERRITIN    Collection Time: 11/20/18  8:40 PM   Result Value Ref Range    Ferritin 634.9 (H) 22.0 - 322.0 ng/mL   Hemoglobin A1c    Collection Time: 11/20/18  8:40 PM   Result Value Ref Range    Glycohemoglobin 6.7 (H) 0.0 - 5.6 %    Est Avg Glucose 146 mg/dL       Imaging/Procedures Review:    Independant Imaging Review: Completed  US-RUQ   Final Result      1.  Prior cholecystectomy   2.  Mild extrahepatic biliary dilatation without intrahepatic biliary dilatation is likely related to age and prior cholecystectomy   3.  Mildly enlarged portal vein with hepatopedal flow   4.  Echogenic liver, a nonspecific finding that often is found to represent steatosis.  Other infiltrative processes could have an identical appearance.           EKG:   No EKG done    Records reviewed and summarized in current documentation :  Yes  [employer] teaching service handout given to patient:  No         Assessment/Plan     * Abdominal pain   Assessment & Plan    RUQ, burning/knawing type pain is concerning for ulcer but with the transaminitis and hyperbilirubinemia made it concerning for choledolithiasis. Patient has had cholecystectomy and no gallstones seen on the US  Guiac negative    Plan:   - day team to consult GI   - morphine 1-3 mg q 4 hrs prn severe pain  - NPO at midnight and holding DVT prophylaxis in case GI would like to do procedure     Transaminitis   Assessment & Plan    Alcohol and tylenol use vs autoimmune hepatitis vs gallstones   ALT/AST repeated on admit   787/348 Alk phos 176  T bili  [facility] down to 2.8 from 5.6  US liver did not show any evidence of stones  Tylenol [facility] < 10 in [facility], lipase wnl 77  Ferritin elevated at 635, acute phase reaction?     Plan:   - day team to consult GI tomorrow  - NPO and holding DVT prophylaxis in case GI would like to do ERCP  - acute hepatitis panel ordered  - INR in the AM   - UDS  - tylenol level            Dyslipidemia   Assessment & Plan    Repeat lipid panel, do not have previous results  Holding simvastatin due to transaminitis     HTN (hypertension)   Assessment & Plan    Continue home lisinopril  monitor     Type 2 diabetes mellitus (HCC)   Assessment & Plan    A1c 6.7% 11/20/18   Holding metformin         Anticipated Hospital stay:  >2 midnights        Quality Measures  Quality-Core Measures   Reviewed items::  Labs reviewed and Medications reviewed  Cadena catheter::  No Cadena  DVT prophylaxis - mechanical:  SCDs  Ulcer Prophylaxis::  No    PCP: No primary care provider on file.

## 2018-11-21 NOTE — ASSESSMENT & PLAN NOTE
-RUQ, burning/knawing type pain is concerning for ulcer but with the transaminitis and hyperbilirubinemia made it concerning for choledolithiasis.   -has had cholecystectomy and no gallstones seen on the US - mild dilation of CBD can be attributed to age/and post surgical changes  -Guiac negative  -LFT's trending downward  -pain resolved - julieta spontaneous stone passage  -counselled on preventative measures for further stone formation  -follow up with PCP

## 2018-11-21 NOTE — SENIOR ADMIT NOTE
"Senior Admit Note    History  Erick Solitario is a 73 y.o. Male with a history of diabetes, dyslipidemia, SIERRA not on CPAP, who presents as a transfer from [facility] for abnormal liver function tests.     Saturday (three days ago) at 4 pm started getting a burning/gnawing \"stomach ache\" (pointed to epigastrium) then had pain at 5 pm then dyspnea at 6 pm with shoulder pain and \"vision was a little off,\" symptoms lasted 4 hours. Patient was concerned for heart attack due to multiple family members having cardiac history.    Patient presented to [facility] 3 days ago and had a negative workup for ACS but positive for H. Pylori. Patient was discharged and did not fill his prescription for triple therapy due to cost of the medications.    2 days ago had another \"attack\" of abdominal pain lasting all night long and presented to the [facility] (today) and had labs that included lipase 66, , , tbili 5.6, dbili 4.3 (elevated compared to [facility]), Tylenol <10. Patient was transferred to [facility] for ERCP.    Denies dark or black stools, history of GI ulcers, prior EGD, history of ACS. Last colonoscopy 1 year ago, patient reported 2 benign polyps removed. Drinks 2 glasses of wine at dinner and coniac at bedtime. BMI 25.84.     At [facility], labs reflected CBC normal, CMP showing , , , tbili 2.8, albumin 4.7, iron sat 26, HAV IgM negative, HBc IgM negative. US RUQ showing only mild biliary duct dilation without note of stone, no reversal of portal flow, and echogenic liver.    Patient states he is DNR and has living will that reflects that.    /96   Pulse 85   Temp 35.9 °C (96.6 °F) (Temporal)   Resp 16   Ht 1.753 m (5' 9\")   Wt 79.4 kg (175 lb)   SpO2 95%   BMI 25.84 kg/m²     Exam  General: No apparent distress.  Eyes: Extraocular movements grossly intact. Scleral icterus noted.  Throat: No erythema or exudates noted.  Neck: Supple. No lymphadenopathy noted.  Lungs: Clear to " auscultation bilaterally.  Cardiovascular: Regular rate and rhythm.  Abdomen: Soft, non-tender, non-distended.  Extremities: No cyanosis or edema.  Skin: No rash or suspicious skin lesions noted on exposed skin.  Neurological: Alert and oriented.  Psychiatric: Normal mood and affect.    Assessment  Epigastric pain  ?H. pylori infection, untreated  Transaminitis  History of cholecystectomy  Type 2 diabetes mellitus, A1c 6.7  SIERRA discontinued CPAP  History of dyslipidemia    Hepatocellular pattern on chemistry with acute rise per outside labs in the setting of recent-onset epigastric pain, unclear etiology.     Plan  Repeat CMP to trend liver function tests  Hold home statin  Coags to assess liver synthetic function  Follow up acute hepatitis panel remaining results  GI consult in the morning for recommendations on additional workup  DNR    Please see Dr. Bentley's H&P for full detals

## 2018-11-21 NOTE — ED TRIAGE NOTES
[facility].  Pt states hx of similar pain.  Seen in [facility] and [facility] today.  Chronic ETOH usage.

## 2018-11-21 NOTE — ED PROVIDER NOTES
"ED Provider Note    Scribed for Frederick Pena M.D. by Kelvin German. 11/20/2018, 6:30 PM.    Primary care provider: No primary care provider noted  Means of arrival: Ambulance  History obtained from: Patient  History limited by: None    CHIEF COMPLAINT  Chief Complaint   Patient presents with   • Abdominal Pain       ZANDER Solitario is a 73 y.o. male who presents to the Emergency Department with complaints of abdominal pain onset 4 days ago. Per patient, starting on Sunday he had sudden onset upper abdominal pain which \"hit like a freight train\". He explains that over the last few days his abdominal pain has been intermittent but he has bouts which last approximately 4-12 hours in duration. Patient notes that when his symptoms began on Saturday he was concerned for a possible heart attaack as he was also experiencing shortness of breath and bilateral shoulder pain. On Saturday the patient went to [facility] for evaluation and had both his troponin checked and an EKG performed and was told that he was not having a heart attack. However, they did find that his H. pylori was positive and was prescribed antibiotic medications and Dicyclomine. Patient notes that he has not been taking his prescribed antibiotics though because this was too expensive for him. He has been taking his Dicyclomine, but this has been without relief. Patient notes that he was evaluated earlier today at the [facility] to be further evaluated and they were concerned for transaminitis and hyperbilirubinemia. They wanted him to come into the ED for further evaluation. Patient has a surgical history of a cholecystectomy. He has a family history of heart disease as well. The patient also admits to drinking 1 glass of wine and a cocktail before bed every night.     REVIEW OF SYSTEMS  See HPI for further details. All other systems are negative.     PAST MEDICAL HISTORY   has a past medical history of BPH (benign prostatic " "hyperplasia); Chronic nonalcoholic liver disease; Diabetes (HCC); Dyslipidemia; Elevated liver enzymes; Hypertension; Obstructive sleep apnea; Rosacea; Shoulder pain; and Vitamin B12 deficiency.    SURGICAL HISTORY  patient denies any surgical history    SOCIAL HISTORY  Social History   Substance Use Topics   • Smoking status: Never Smoker   • Smokeless tobacco: Never Used   • Alcohol use Yes      Comment: etoh abuse      History   Drug use: Unknown       FAMILY HISTORY  History reviewed. No pertinent family history.    CURRENT MEDICATIONS  Reviewed.  See Encounter Summary.     ALLERGIES  Allergies   Allergen Reactions   • Rosuvastatin      Muscle ache       PHYSICAL EXAM  VITAL SIGNS: /96   Pulse 85   Temp 35.9 °C (96.6 °F) (Temporal)   Resp 16   Ht 1.753 m (5' 9\")   Wt 79.4 kg (175 lb)   SpO2 95%   BMI 25.84 kg/m²   Constitutional: Alert in no apparent distress.  HENT: No signs of trauma, Bilateral external ears normal, Nose normal.   Eyes: Pupils are equal and reactive. Scleral icterus   Neck: Normal range of motion, No tenderness, Supple, No stridor.   Lymphatic: No lymphadenopathy noted.   Cardiovascular: Regular rate and rhythm, no murmurs.   Thorax & Lungs: Normal breath sounds, No respiratory distress, No wheezing, No chest tenderness.   Abdomen: Bowel sounds normal, Soft, No tenderness, No masses, No pulsatile masses. No peritoneal signs.  Skin: Warm, Dry, No erythema, No rash.   Back: No bony tenderness, No CVA tenderness.   Extremities: Intact distal pulses, No edema, No tenderness, No cyanosis  Musculoskeletal: Good range of motion in all major joints. No tenderness to palpation or major deformities noted.   Neurologic: Alert , Normal motor function, Normal sensory function, No focal deficits noted.   Psychiatric: Affect normal, Judgment normal, Mood normal.     Results for orders placed or performed during the hospital encounter of 11/20/18   HEPATITIS PANEL ACUTE(4 COMPONENTS)   Result " Value Ref Range    Hepatitis B Cors Ab,IgM Negative Negative    Hepatitis A Virus Ab, IgM Negative Negative   CBC with Differential   Result Value Ref Range    WBC 5.0 4.8 - 10.8 K/uL    RBC 5.20 4.70 - 6.10 M/uL    Hemoglobin 16.0 14.0 - 18.0 g/dL    Hematocrit 47.3 42.0 - 52.0 %    MCV 91.0 81.4 - 97.8 fL    MCH 30.8 27.0 - 33.0 pg    MCHC 33.8 33.7 - 35.3 g/dL    RDW 44.1 35.9 - 50.0 fL    Platelet Count 196 164 - 446 K/uL    MPV 10.2 9.0 - 12.9 fL    Neutrophils-Polys 58.70 44.00 - 72.00 %    Lymphocytes 26.90 22.00 - 41.00 %    Monocytes 9.00 0.00 - 13.40 %    Eosinophils 4.80 0.00 - 6.90 %    Basophils 0.20 0.00 - 1.80 %    Immature Granulocytes 0.40 0.00 - 0.90 %    Nucleated RBC 0.00 /100 WBC    Neutrophils (Absolute) 2.94 1.82 - 7.42 K/uL    Lymphs (Absolute) 1.35 1.00 - 4.80 K/uL    Monos (Absolute) 0.45 0.00 - 0.85 K/uL    Eos (Absolute) 0.24 0.00 - 0.51 K/uL    Baso (Absolute) 0.01 0.00 - 0.12 K/uL    Immature Granulocytes (abs) 0.02 0.00 - 0.11 K/uL    NRBC (Absolute) 0.00 K/uL   Comp Metabolic Panel (CMP)   Result Value Ref Range    Sodium 139 135 - 145 mmol/L    Potassium 3.5 (L) 3.6 - 5.5 mmol/L    Chloride 104 96 - 112 mmol/L    Co2 25 20 - 33 mmol/L    Anion Gap 10.0 0.0 - 11.9    Glucose 92 65 - 99 mg/dL    Bun 11 8 - 22 mg/dL    Creatinine 0.93 0.50 - 1.40 mg/dL    Calcium 9.4 8.5 - 10.5 mg/dL    AST(SGOT) 348 (H) 12 - 45 U/L    ALT(SGPT) 787 (H) 2 - 50 U/L    Alkaline Phosphatase 176 (H) 30 - 99 U/L    Total Bilirubin 2.8 (H) 0.1 - 1.5 mg/dL    Albumin 4.7 3.2 - 4.9 g/dL    Total Protein 7.5 6.0 - 8.2 g/dL    Globulin 2.8 1.9 - 3.5 g/dL    A-G Ratio 1.7 g/dL   FERRITIN   Result Value Ref Range    Ferritin 634.9 (H) 22.0 - 322.0 ng/mL   Hemoglobin A1c   Result Value Ref Range    Glycohemoglobin 6.7 (H) 0.0 - 5.6 %    Est Avg Glucose 146 mg/dL   IRON/TOTAL IRON BIND   Result Value Ref Range    Iron 114 50 - 180 ug/dL    Total Iron Binding 447 250 - 450 ug/dL    % Saturation 26 15 - 55 %   LIPASE    Result Value Ref Range    Lipase 71 11 - 82 U/L   Magnesium   Result Value Ref Range    Magnesium 2.0 1.5 - 2.5 mg/dL   ESTIMATED GFR   Result Value Ref Range    GFR If African American >60 >60 mL/min/1.73 m 2    GFR If Non African American >60 >60 mL/min/1.73 m 2       COURSE & MEDICAL DECISION MAKING  Pertinent Labs & Imaging studies reviewed. (See chart for details)    Past medical records were reviewed by me which indicated: Today lipase is 66. AST is 413. Alt 757. Tbili 5.6. D-bili 4.3. New and elevated labs compared to [facility] labs  Transferred here for ERCP.       6:30 PM - Patient seen and examined at bedside.     7:19 PM - Paged [facility].     7:29 PM I discussed the patient's case and the above findings with [facility] who agrees to admit the patient.     Decision Making:  This is a 73 y.o. year old male who presents with above complaint.  Patient has had interval lab changes including worsening transaminitis and new hyperbilirubinemia.  Patient transferred to this hospital for further ERCP workup.  This is after being seen by the [facility] earlier today.  I discussed the case with you at our service and they will continue to orchestrate this plan.    DISPOSITION:  Patient will be admitted to [facility] in guarded condition.    FINAL IMPRESSION  1. Transaminitis    2. Hyperbilirubinemia          Kelvin CRZU (Scribe), am scribing for, and in the presence of, Frederick Pena M.D..    Electronically signed by: Kelvin German (Connoribe), 11/20/2018    Frederick CRUZ M.D. personally performed the services described in this documentation, as scribed by Kelvin German in my presence, and it is both accurate and complete. C.     The note accurately reflects work and decisions made by me.  Frederick Pena  11/21/2018  2:44 AM

## 2018-11-22 VITALS
WEIGHT: 175 LBS | DIASTOLIC BLOOD PRESSURE: 69 MMHG | OXYGEN SATURATION: 96 % | SYSTOLIC BLOOD PRESSURE: 100 MMHG | HEIGHT: 69 IN | RESPIRATION RATE: 17 BRPM | BODY MASS INDEX: 25.92 KG/M2 | TEMPERATURE: 97.9 F | HEART RATE: 69 BPM

## 2018-11-22 PROBLEM — I95.9 HYPOTENSION: Status: RESOLVED | Noted: 2018-11-21 | Resolved: 2018-11-22

## 2018-11-22 PROBLEM — R10.9 ABDOMINAL PAIN: Status: RESOLVED | Noted: 2018-11-21 | Resolved: 2018-11-22

## 2018-11-22 PROBLEM — E87.6 HYPOKALEMIA: Status: RESOLVED | Noted: 2018-11-21 | Resolved: 2018-11-22

## 2018-11-22 LAB
ALBUMIN SERPL BCP-MCNC: 4 G/DL (ref 3.2–4.9)
ALBUMIN/GLOB SERPL: 1.7 G/DL
ALP SERPL-CCNC: 122 U/L (ref 30–99)
ALT SERPL-CCNC: 362 U/L (ref 2–50)
ANION GAP SERPL CALC-SCNC: 7 MMOL/L (ref 0–11.9)
AST SERPL-CCNC: 103 U/L (ref 12–45)
BASOPHILS # BLD AUTO: 0.4 % (ref 0–1.8)
BASOPHILS # BLD: 0.02 K/UL (ref 0–0.12)
BILIRUB SERPL-MCNC: 1 MG/DL (ref 0.1–1.5)
BUN SERPL-MCNC: 16 MG/DL (ref 8–22)
CALCIUM SERPL-MCNC: 9.4 MG/DL (ref 8.5–10.5)
CHLORIDE SERPL-SCNC: 110 MMOL/L (ref 96–112)
CO2 SERPL-SCNC: 24 MMOL/L (ref 20–33)
CREAT SERPL-MCNC: 0.97 MG/DL (ref 0.5–1.4)
EOSINOPHIL # BLD AUTO: 0.37 K/UL (ref 0–0.51)
EOSINOPHIL NFR BLD: 7.4 % (ref 0–6.9)
ERYTHROCYTE [DISTWIDTH] IN BLOOD BY AUTOMATED COUNT: 44.2 FL (ref 35.9–50)
GLOBULIN SER CALC-MCNC: 2.3 G/DL (ref 1.9–3.5)
GLUCOSE SERPL-MCNC: 94 MG/DL (ref 65–99)
HCT VFR BLD AUTO: 41.2 % (ref 42–52)
HGB BLD-MCNC: 13.9 G/DL (ref 14–18)
IMM GRANULOCYTES # BLD AUTO: 0.01 K/UL (ref 0–0.11)
IMM GRANULOCYTES NFR BLD AUTO: 0.2 % (ref 0–0.9)
INR PPP: 1.01 (ref 0.87–1.13)
IRON SATN MFR SERPL: 16 % (ref 15–55)
IRON SERPL-MCNC: 63 UG/DL (ref 50–180)
LYMPHOCYTES # BLD AUTO: 1.84 K/UL (ref 1–4.8)
LYMPHOCYTES NFR BLD: 36.8 % (ref 22–41)
MCH RBC QN AUTO: 31 PG (ref 27–33)
MCHC RBC AUTO-ENTMCNC: 33.7 G/DL (ref 33.7–35.3)
MCV RBC AUTO: 91.8 FL (ref 81.4–97.8)
MONOCYTES # BLD AUTO: 0.45 K/UL (ref 0–0.85)
MONOCYTES NFR BLD AUTO: 9 % (ref 0–13.4)
NEUTROPHILS # BLD AUTO: 2.31 K/UL (ref 1.82–7.42)
NEUTROPHILS NFR BLD: 46.2 % (ref 44–72)
NRBC # BLD AUTO: 0 K/UL
NRBC BLD-RTO: 0 /100 WBC
PLATELET # BLD AUTO: 178 K/UL (ref 164–446)
PMV BLD AUTO: 10.7 FL (ref 9–12.9)
POTASSIUM SERPL-SCNC: 4.2 MMOL/L (ref 3.6–5.5)
PROT SERPL-MCNC: 6.3 G/DL (ref 6–8.2)
PROTHROMBIN TIME: 13.4 SEC (ref 12–14.6)
RBC # BLD AUTO: 4.49 M/UL (ref 4.7–6.1)
SODIUM SERPL-SCNC: 141 MMOL/L (ref 135–145)
TIBC SERPL-MCNC: 393 UG/DL (ref 250–450)
WBC # BLD AUTO: 5 K/UL (ref 4.8–10.8)

## 2018-11-22 PROCEDURE — 700111 HCHG RX REV CODE 636 W/ 250 OVERRIDE (IP): Performed by: STUDENT IN AN ORGANIZED HEALTH CARE EDUCATION/TRAINING PROGRAM

## 2018-11-22 PROCEDURE — 85025 COMPLETE CBC W/AUTO DIFF WBC: CPT

## 2018-11-22 PROCEDURE — A9270 NON-COVERED ITEM OR SERVICE: HCPCS | Performed by: STUDENT IN AN ORGANIZED HEALTH CARE EDUCATION/TRAINING PROGRAM

## 2018-11-22 PROCEDURE — 86038 ANTINUCLEAR ANTIBODIES: CPT

## 2018-11-22 PROCEDURE — 99238 HOSP IP/OBS DSCHRG MGMT 30/<: CPT | Mod: GC | Performed by: INTERNAL MEDICINE

## 2018-11-22 PROCEDURE — 83540 ASSAY OF IRON: CPT

## 2018-11-22 PROCEDURE — 83550 IRON BINDING TEST: CPT

## 2018-11-22 PROCEDURE — 700102 HCHG RX REV CODE 250 W/ 637 OVERRIDE(OP): Performed by: STUDENT IN AN ORGANIZED HEALTH CARE EDUCATION/TRAINING PROGRAM

## 2018-11-22 PROCEDURE — 700102 HCHG RX REV CODE 250 W/ 637 OVERRIDE(OP): Performed by: INTERNAL MEDICINE

## 2018-11-22 PROCEDURE — 36415 COLL VENOUS BLD VENIPUNCTURE: CPT

## 2018-11-22 PROCEDURE — 85610 PROTHROMBIN TIME: CPT

## 2018-11-22 PROCEDURE — 80053 COMPREHEN METABOLIC PANEL: CPT

## 2018-11-22 PROCEDURE — A9270 NON-COVERED ITEM OR SERVICE: HCPCS | Performed by: INTERNAL MEDICINE

## 2018-11-22 RX ADMIN — CYANOCOBALAMIN TAB 500 MCG 1000 MCG: 500 TAB at 05:33

## 2018-11-22 RX ADMIN — THIAMINE HCL TAB 100 MG 100 MG: 100 TAB at 05:33

## 2018-11-22 RX ADMIN — FOLIC ACID 1 MG: 1 TABLET ORAL at 05:33

## 2018-11-22 ASSESSMENT — PAIN SCALES - GENERAL: PAINLEVEL_OUTOF10: 0

## 2018-11-22 NOTE — PROGRESS NOTES
RN educated Pt about appropriate safety and rules on unit. Pt ambulates off unit without informing staff and disconnects IV, despite Pt education.

## 2018-11-22 NOTE — PROGRESS NOTES
Pt has been given discharge paperwork and prescriptions.  Pt verbalized understanding of paperwork and of additions/changes to medication regimen.  Pt PIV d/c'd, tip intact.  Pt leaving via car with self to home. [facility].

## 2018-11-22 NOTE — DISCHARGE INSTRUCTIONS
Discharge Instructions    Discharged to home by car with self. Discharged via walking, hospital escort: Refused.  Special equipment needed: Not Applicable    Be sure to schedule a follow-up appointment with your primary care doctor or any specialists as instructed.     Discharge Plan:   Diet Plan: Discussed  Activity Level: Discussed  Confirmed Follow up Appointment: Patient to Call and Schedule Appointment  Confirmed Symptoms Management: Discussed  Medication Reconciliation Updated: Yes  Influenza Vaccine Indication: Not indicated: Previously immunized this influenza season and > 8 years of age    I understand that a diet low in cholesterol, fat, and sodium is recommended for good health. Unless I have been given specific instructions below for another diet, I accept this instruction as my diet prescription.   Other diet: Diabetic    Special Instructions: None    · Is patient discharged on Warfarin / Coumadin?   No     Depression / Suicide Risk    As you are discharged from this [facility] facility, it is important to learn how to keep safe from harming yourself.    Recognize the warning signs:  · Abrupt changes in personality, positive or negative- including increase in energy   · Giving away possessions  · Change in eating patterns- significant weight changes-  positive or negative  · Change in sleeping patterns- unable to sleep or sleeping all the time   · Unwillingness or inability to communicate  · Depression  · Unusual sadness, discouragement and loneliness  · Talk of wanting to die  · Neglect of personal appearance   · Rebelliousness- reckless behavior  · Withdrawal from people/activities they love  · Confusion- inability to concentrate     If you or a loved one observes any of these behaviors or has concerns about self-harm, here's what you can do:  · Talk about it- your feelings and reasons for harming yourself  · Remove any means that you might use to hurt yourself (examples: pills, rope, extension  cords, firearm)  · Get professional help from the community (Mental Health, Substance Abuse, psychological counseling)  · Do not be alone:Call your Safe Contact- someone whom you trust who will be there for you.  · Call your local CRISIS HOTLINE 599-0302 or 902-232-1079  · Call your local Children's Mobile Crisis Response Team [place] (589) 571-0458 or www.[employer]  · Call the toll free National Suicide Prevention Hotlines   · National Suicide Prevention Lifeline 759-081-MGJP (8573)  · National Hope Line Network 800-SUICIDE (559-6939)

## 2018-11-24 LAB — NUCLEAR IGG SER QL IA: NORMAL
